# Patient Record
Sex: FEMALE | Race: WHITE
[De-identification: names, ages, dates, MRNs, and addresses within clinical notes are randomized per-mention and may not be internally consistent; named-entity substitution may affect disease eponyms.]

---

## 2020-06-09 ENCOUNTER — HOSPITAL ENCOUNTER (INPATIENT)
Dept: HOSPITAL 92 - ERS | Age: 60
LOS: 7 days | Discharge: NURSING, REHAB OR LONG-TERM CARE | DRG: 64 | End: 2020-06-16
Attending: FAMILY MEDICINE | Admitting: FAMILY MEDICINE
Payer: OTHER GOVERNMENT

## 2020-06-09 ENCOUNTER — HOSPITAL ENCOUNTER (EMERGENCY)
Dept: HOSPITAL 9 - MADERS | Age: 60
Discharge: ANOTHER HOSPITAL | End: 2020-06-09
Payer: OTHER GOVERNMENT

## 2020-06-09 VITALS — BODY MASS INDEX: 30.7 KG/M2

## 2020-06-09 DIAGNOSIS — I10: ICD-10-CM

## 2020-06-09 DIAGNOSIS — I62.9: Primary | ICD-10-CM

## 2020-06-09 DIAGNOSIS — G81.94: ICD-10-CM

## 2020-06-09 DIAGNOSIS — Z90.49: ICD-10-CM

## 2020-06-09 DIAGNOSIS — G81.91: ICD-10-CM

## 2020-06-09 DIAGNOSIS — I62.9: ICD-10-CM

## 2020-06-09 DIAGNOSIS — R47.01: ICD-10-CM

## 2020-06-09 DIAGNOSIS — E87.6: ICD-10-CM

## 2020-06-09 DIAGNOSIS — Z98.51: ICD-10-CM

## 2020-06-09 DIAGNOSIS — I63.312: Primary | ICD-10-CM

## 2020-06-09 DIAGNOSIS — N39.0: ICD-10-CM

## 2020-06-09 DIAGNOSIS — G93.6: ICD-10-CM

## 2020-06-09 LAB
ALBUMIN SERPL BCG-MCNC: 4.4 G/DL (ref 3.5–5)
ALP SERPL-CCNC: 121 U/L (ref 40–110)
ALT SERPL W P-5'-P-CCNC: 26 U/L (ref 8–55)
ANION GAP SERPL CALC-SCNC: 21 MMOL/L (ref 10–20)
APAP SERPL-MCNC: (no result) MCG/ML (ref 10–30)
APTT PPP: 23.8 SEC (ref 22.9–36.1)
AST SERPL-CCNC: 16 U/L (ref 5–34)
BASOPHILS # BLD AUTO: 0 THOU/UL (ref 0–0.2)
BASOPHILS NFR BLD AUTO: 0.2 % (ref 0–1)
BILIRUB SERPL-MCNC: 0.5 MG/DL (ref 0.2–1.2)
BUN SERPL-MCNC: 11 MG/DL (ref 9.8–20.1)
CALCIUM SERPL-MCNC: 10.1 MG/DL (ref 7.8–10.44)
CHLORIDE SERPL-SCNC: 104 MMOL/L (ref 98–107)
CK SERPL-CCNC: 94 U/L (ref 29–168)
CO2 SERPL-SCNC: 20 MMOL/L (ref 22–29)
CREAT CL PREDICTED SERPL C-G-VRATE: 0 ML/MIN (ref 70–130)
DRUG SCREEN CUTOFF: (no result)
EOSINOPHIL # BLD AUTO: 0 THOU/UL (ref 0–0.7)
EOSINOPHIL NFR BLD AUTO: 0.1 % (ref 0–10)
GLOBULIN SER CALC-MCNC: 3.4 G/DL (ref 2.4–3.5)
GLUCOSE SERPL-MCNC: 146 MG/DL (ref 70–105)
HGB BLD-MCNC: 14.4 G/DL (ref 12–16)
INR PPP: 1
LYMPHOCYTES # BLD AUTO: 0.7 THOU/UL (ref 1.2–3.4)
LYMPHOCYTES NFR BLD AUTO: 4.8 % (ref 21–51)
MCH RBC QN AUTO: 25.8 PG (ref 27–31)
MCV RBC AUTO: 84 FL (ref 78–98)
MEDTOX CONTROL LINE VALID?: (no result)
MONOCYTES # BLD AUTO: 0.4 THOU/UL (ref 0.11–0.59)
MONOCYTES NFR BLD AUTO: 2.4 % (ref 0–10)
MUCOUS THREADS UR QL AUTO: (no result) LPF
NEUTROPHILS # BLD AUTO: 14 THOU/UL (ref 1.4–6.5)
NEUTROPHILS NFR BLD AUTO: 92.5 % (ref 42–75)
PLATELET # BLD AUTO: 259 THOU/UL (ref 130–400)
POTASSIUM SERPL-SCNC: 3.8 MMOL/L (ref 3.5–5.1)
PROT UR STRIP.AUTO-MCNC: 30 MG/DL
PROTHROMBIN TIME: 13.1 SEC (ref 12–14.7)
RBC # BLD AUTO: 5.6 MILL/UL (ref 4.2–5.4)
RBC UR QL AUTO: (no result) HPF (ref 0–3)
SALICYLATES SERPL-MCNC: (no result) MG/DL (ref 15–30)
SODIUM SERPL-SCNC: 141 MMOL/L (ref 136–145)
WBC # BLD AUTO: 15.1 THOU/UL (ref 4.8–10.8)
WBC UR QL AUTO: (no result) HPF (ref 0–3)

## 2020-06-09 PROCEDURE — 80053 COMPREHEN METABOLIC PANEL: CPT

## 2020-06-09 PROCEDURE — 85025 COMPLETE CBC W/AUTO DIFF WBC: CPT

## 2020-06-09 PROCEDURE — 84484 ASSAY OF TROPONIN QUANT: CPT

## 2020-06-09 PROCEDURE — 95712 VEEG 2-12 HR INTMT MNTR: CPT

## 2020-06-09 PROCEDURE — 87077 CULTURE AEROBIC IDENTIFY: CPT

## 2020-06-09 PROCEDURE — 95957 EEG DIGITAL ANALYSIS: CPT

## 2020-06-09 PROCEDURE — S0028 INJECTION, FAMOTIDINE, 20 MG: HCPCS

## 2020-06-09 PROCEDURE — 80306 DRUG TEST PRSMV INSTRMNT: CPT

## 2020-06-09 PROCEDURE — 36416 COLLJ CAPILLARY BLOOD SPEC: CPT

## 2020-06-09 PROCEDURE — 82550 ASSAY OF CK (CPK): CPT

## 2020-06-09 PROCEDURE — 85610 PROTHROMBIN TIME: CPT

## 2020-06-09 PROCEDURE — 96365 THER/PROPH/DIAG IV INF INIT: CPT

## 2020-06-09 PROCEDURE — 80061 LIPID PANEL: CPT

## 2020-06-09 PROCEDURE — 81001 URINALYSIS AUTO W/SCOPE: CPT

## 2020-06-09 PROCEDURE — 95819 EEG AWAKE AND ASLEEP: CPT

## 2020-06-09 PROCEDURE — 80048 BASIC METABOLIC PNL TOTAL CA: CPT

## 2020-06-09 PROCEDURE — 83735 ASSAY OF MAGNESIUM: CPT

## 2020-06-09 PROCEDURE — 81003 URINALYSIS AUTO W/O SCOPE: CPT

## 2020-06-09 PROCEDURE — 96374 THER/PROPH/DIAG INJ IV PUSH: CPT

## 2020-06-09 PROCEDURE — 36415 COLL VENOUS BLD VENIPUNCTURE: CPT

## 2020-06-09 PROCEDURE — 93005 ELECTROCARDIOGRAM TRACING: CPT

## 2020-06-09 PROCEDURE — 93306 TTE W/DOPPLER COMPLETE: CPT

## 2020-06-09 PROCEDURE — 94760 N-INVAS EAR/PLS OXIMETRY 1: CPT

## 2020-06-09 PROCEDURE — 81015 MICROSCOPIC EXAM OF URINE: CPT

## 2020-06-09 PROCEDURE — 87086 URINE CULTURE/COLONY COUNT: CPT

## 2020-06-09 PROCEDURE — 51702 INSERT TEMP BLADDER CATH: CPT

## 2020-06-09 PROCEDURE — 93880 EXTRACRANIAL BILAT STUDY: CPT

## 2020-06-09 PROCEDURE — 70551 MRI BRAIN STEM W/O DYE: CPT

## 2020-06-09 PROCEDURE — 85730 THROMBOPLASTIN TIME PARTIAL: CPT

## 2020-06-09 PROCEDURE — 96366 THER/PROPH/DIAG IV INF ADDON: CPT

## 2020-06-09 PROCEDURE — 83036 HEMOGLOBIN GLYCOSYLATED A1C: CPT

## 2020-06-09 PROCEDURE — 83930 ASSAY OF BLOOD OSMOLALITY: CPT

## 2020-06-09 PROCEDURE — 70450 CT HEAD/BRAIN W/O DYE: CPT

## 2020-06-09 PROCEDURE — 87186 SC STD MICRODIL/AGAR DIL: CPT

## 2020-06-09 PROCEDURE — 80307 DRUG TEST PRSMV CHEM ANLYZR: CPT

## 2020-06-09 RX ADMIN — NICARDIPINE HYDROCHLORIDE PRN MLS: 25 INJECTION INTRAVENOUS at 09:35

## 2020-06-09 RX ADMIN — NICARDIPINE HYDROCHLORIDE PRN MLS: 25 INJECTION INTRAVENOUS at 17:25

## 2020-06-09 RX ADMIN — FAMOTIDINE SCH MG: 10 INJECTION, SOLUTION INTRAVENOUS at 20:15

## 2020-06-09 RX ADMIN — FAMOTIDINE SCH MG: 10 INJECTION, SOLUTION INTRAVENOUS at 09:34

## 2020-06-09 RX ADMIN — NICARDIPINE HYDROCHLORIDE PRN MLS: 25 INJECTION INTRAVENOUS at 11:27

## 2020-06-09 NOTE — PDOC.FPRHP
- History of Present Illness


Chief Complaint: Stroke


History of Present Illness: 





Pt is a 59 yo female transferred from [place] secondary to a hemorrhagic 

stroke noted on CT scan.  EMS was notified by pt's out of town  who was 

unable to get in touch with her for 17 hours. EMS found pt and was unable to 

provide much information but was having "stroke-like" symptoms affecting R side 

strength and a degree of aphasia.





PCP: [facility]


ED Course: 





In the ED, Dr. Ambriz consulted Neurosurgery, [place].  Cardene drip initiated. 

Believe it is non-surgical.





- History


PMHx:


 


PSHx: 





FHx:


 


Social:


 








- Vital signs


BP: 146/88  HR: 112 RR: 20 Tmax: 98.4 Pox: 97% on RA  Wt: 98 kg   








FMR H&P: Upper Level





- Pertinent history





59 yo F with hx of HTN here as transfer from [place] with dx there of 

hemorrhagic CVA. Pt was last seen normal 17 hours PTA. She was found after a 

wellness check called in by her  who is currently abroad. Upon arrival 

to the ED she was aphasic and had right hemiplegia. There were no significant 

lab abnormalities. BP was greater than 220 systolic. CT here show intracranial 

hemorrhage in the L MCA distribution. Neurosurgery was consulted from the ER 

who determined there to be no surgical intervention indicated at that time. She 

was started on a Cardene drip in the ER and BP improved to the 140 systolic 

range. Upon evaluation pt could no appropriately answer questions





PMHx


HTN





Surgical hx


Appendectomy 





Social 


No etoh, tobacco, or drug history  





- Pertinent findings





See intern note for full ROS, PE, vitals, and labs





ROS  unobtainable 





PE


General NAD, awake and responsive 


HEENT NCAT


CV RRR, 3/6 systolic murmur 


Resp CTA, no respiratory distress


Abd non tender, no distension, normal BS


Extremities no edema, equal pedal pulses


Skin multiple dressed ulcerations on LE


Neuro pt has slowed mentation, but will move all extremities upon command. She 

has equal strength b/l. CN appear to be intact, however she is having some 

difficulty following commands involved in evaluation. Pt can speak, however 

does not answer appropriately 








- Plan


Date/Time: 06/09/20 0656








Ad QUEVEDO,  PGY3, have evaluated this patient and agree with findings/

plan as outlined by intern resident. Pertinent changes/additions are listed 

here.








1.Acute hemorrhagic CVA


-Admit to CCU


-Continue cardene with goal of <160 systolic per neurosurg recommendations


-Repeat CT at 1200


-Consult stroke team


-Lipid, A1c, TSH





2.HTN


-Pt apparently not on any meds per . Will start meds as indicated by BP 

after cardene is stopped   





PPx SCD


Diet NPO


Code Full








Addendum - Attending





- Attending Attestation


Date/Time: 06/09/20 0900





I personally evaluated the patient and discussed the management with Dr. aGge

/Guillaume. 


I agree with the History, Examination, Assessment and Plan documented above 

with any addition or exceptions noted below.





Patient here with acute hemorrhagic infarct seen on CT with initial R sided 

paresis that has improved somewhat. She is currently awake, but slow to 

respond. Has expressive aphasia. She is able to move both the RUE and RLE. NSGY 

on board, recommends repeat NCHCT in a few hours. Cardene with goal SBP <160. 

Will need otherwise the normal post- hemorrhagic CVA care including Neuro, 

therapy services, and likely placement. Hold ASA.

## 2020-06-09 NOTE — CON
DATE OF CONSULTATION:  



Ms. Sy is a 60-year-old woman transferred to us from [facility]

for stroke-like symptoms reportedly from hospital there.   is deployed

overseas and had been in contact with his wife now 20 to 24 hours ago, but had not

heard from her since and has not been able to make any contact since either.  For

this reason, he called the [facility] for a welfare check.  At which point, folks found

her on scene confused with possible stroke-like symptoms.  She was taken to

[place] [facility] ER, where CT was performed, that showed a superficial left-sided

frontoparietal intracerebral hemorrhage, which measures roughly 40 cubic centimeter

in size with sizable rim of vasogenic edema and midline shift, measuring around 3

mm.  She was then transferred to [facility] for further evaluation and

admission.  We do not have any medical history on file other than knowing patient

has history of hypertension.  She was extraordinarily hypertensive in the department

in [place] and upon arrival here with systolics in the 220s, though we do not

know what medications that she is currently on.   is not reachable given he

is out of the country at the moment and we have no other additional contacts to

verify.  Coagulation studies and platelet count are normal.  However, there are

certainly anticoagulant and antiplatelets that will not cause such drastic lab

abnormalities.  At bedside this morning, the patient is drowsy, but arousable with

minimal effort.  Upon repeated questioning, she is able to tell me her name, but

does not tell me the month or the year.  She does follow simple commands, squeezes

my hands, raises her legs, wiggles her toes bilaterally.  She actually has quite

excellent strength in the right upper and right lower extremity, which sounds to be

much improved, given our initial discussion was right-sided weakness that was pretty

dense.  Recommendation at this time, systolic pressure is under 160 with repeat CT

scan around noon today.  As long as this looks stable, this likely represents

nonoperative hemorrhage if she develops more significant midline shift and vasogenic

edema, it is reasonable to consider adding mannitol therapy, but for now, we will

hold this given her relatively improved neurologic examination and minimal midline

shift.  We will know more after repeat CT. 







Job ID:  [id number]

## 2020-06-09 NOTE — CON
DATE OF CONSULTATION:  



HISTORY OF PRESENT ILLNESS:  Anu Sy is a 60-year-old female from

[place], who apparently was found with stroke-like symptoms.  In the emergency

room, CT showed a large left-sided hemorrhage, probably hypertensive.  She was put

on a Cardene drip and transferred here.  In the ICU, she has been complaining of

some pain, she is not moving her right side.  Pulse is 103, blood pressure 160/83,

saturations 95%, and respiratory rate 17. 



We are unable to get any additional information at this stage.  We will try and

obtain one as soon as family is available. 



PAST MEDICAL HISTORY:  Unknown.



PAST SURGICAL HISTORY:  Unknown.



REVIEW OF SYSTEMS:  Otherwise, unobtainable.



PHYSICAL EXAMINATION:

VITAL SIGNS:  Temperature is 98, pulse is 107, blood pressure 159/83, and sats are

95%. 

CHEST:  Decreased breath sounds.  No wheezing. 

CARDIAC:  Normal S1 and S2.  No gallops. 

ABDOMEN:  No masses.



LABORATORY DATA:  Labs, so far, unremarkable.  Lytes are normal.  White count 15,000

__________ drug screen is negative. 



IMPRESSION:  Left temporoparietal hemorrhage, large, 5 x 5 cm, history of presumed

hypertension. 



PLAN:  The patient is complaining of pain.  We are going to initiate some pain

medicine, low-dose morphine. 



Unfortunately, there is no IV Tylenol. 



Pulmonary/Critical Care will follow while in the ICU. 



Consultation note, 70 minutes, 50% direct patient care. 



Please note, we will wait for additional history to be obtained from family members

as soon as they arrive. 







Job ID:  [id number]

## 2020-06-09 NOTE — CON
DATE OF CONSULTATION:  06/09/2020



REASON FOR CONSULTATION:  Hemorrhagic stroke.



HISTORY OF PRESENT ILLNESS:  Ms. Anu Sy is a 60-year-old female, who has 
been

transferred from [place] because of hemorrhagic stroke, which was noted on 
head

CT.  The patient is unable to provide history at this time.  The history is 
obtained

from review of the records.  According to the review of the records, the 
 who

was out of town called the EMS to check on the patient since he was unable to 
get in

touch for the last 17 hours.  EMS found the patient at home, confused and 
unable to

provide history with right-sided weakness.   In the  emergency room, she was 
found

to be aphasic and had right hemiplegia.  Her blood pressure was greater than 
220 and the

head CT shows intracranial hemorrhage in the left MCA distribution.  
Neurosurgery

was consulted and they determined there was no surgical intervention needed at 
this

time and she was started on Cardene drip and sent to ICU for further 
evaluation. 



REVIEW OF SYSTEMS:  Unobtainable due to patient's mental status.



PAST MEDICAL HISTORY:  Hypertension.



PAST SURGICAL HISTORY:  Appendectomy.



SOCIAL HISTORY:  .  Lives with .  No history of alcohol, illegal 
drug

abuse, or tobacco abuse. 

 



FAMILY HISTORY: No significant history.



PHYSICAL EXAMINATION:

GENERAL:  The patient is awake, alert, follows commands intermittently. 

HEENT:  Normocephalic and atraumatic. 

CVS:  Regular rate and rhythm. 

CHEST:  Clear. 

ABDOMEN:  Soft. 

NEUROLOGICAL:  Mental status; the patient is alert, awake, follows commands

intermittently.  Oriented to her name only.  Cranial nerves 2 through 12 intact.

Speech, receptive aphasia.  Muscle, tone, and bulk are normal.  Strength 3/5 in 
the

right upper and lower extremity.  5/5 in the left upper and lower extremity.

Sensory:  Withdraws to nailbed pressure, left greater than right.  Cerebellar, 
did

not cooperate with the exam.  Gait deferred due to patient's safety reason.  She

does seem to have right extraocular movements.  Follows commands intermittently
, but

appears to have  right  homonymous hemianopia.  Pupils equal and reactive to

light.  Face symmetric.  Tongue midline.  Moves neck in both direction.  Hearing

seems to be intact. 



DATA REVIEWED:  I reviewed the head CT, which showed hemorrhage in the left MCA

distribution. 



ASSESSMENT AND PLAN:  Ms. Anu Sy is consulted for hemorrhagic stroke.

Recommend MRI of the brain when stable.  Neuro checks every 2 hours.  Consider 
stat

noncontrast head CT if the condition is going to decline.

Hold aspirin at this time.  Monitor blood pressure.  Systolic blood pressure 
should

be less than 160.  Continue medical management per primary team.  MRI of the 
brain

when stable.  2D echocardiogram, carotid Dopplers when stable.  Check lipid 
panel,

hemoglobin A1c, TSH, telemetry.  Continue medical management per primary team. 
PT/OT/Speech when stable. We

will continue to follow. 



Thank you for the consult.







Job ID:  [id number]



[facility] Hospital[facility]AMIAN

## 2020-06-09 NOTE — CT
CT BRAIN NONCONTRAST:



DATE:

6/9/2020

12 6:00 PM



HISTORY:

60-year-old female follow-up intracranial hemorrhage



COMPARISON:

6/9/2020

4:59 AM



FINDINGS:

Previously, the scan was performed in transverse plane, without angulation. On the current CT, the st
andard coronal-transverse angulation was applied. This makes comparison slightly difficult.



The acute left temporal parietal large intra-axial hematoma measuring approximately 5.8 x 3.9 cm, wit
h surrounding vasogenic edema, has probably not significantly changed in size. It causes mass

effect, completely effacing the trigone and occipital horn of the left lateral ventricle, distorting 
the lateral ventricles, and causing right to left midline shift of the septum pellucidum a distance

of approximately 0.8 cm. There is no new hemorrhage. There is probably no interval change overall.



IMPRESSION:

1) large, acute, left cerebral intra-axial hematoma causing mass effect and subfalcine herniation.

2) probably no significant interval change.

3) continued follow-up recommended.



Reported By: Darryl Obregon 

Electronically Signed:  6/9/2020 12:19 PM

## 2020-06-10 LAB
ANION GAP SERPL CALC-SCNC: 11 MMOL/L (ref 10–20)
BASOPHILS # BLD AUTO: 0 THOU/UL (ref 0–0.2)
BASOPHILS NFR BLD AUTO: 0.1 % (ref 0–1)
BUN SERPL-MCNC: 11 MG/DL (ref 9.8–20.1)
CALCIUM SERPL-MCNC: 9.5 MG/DL (ref 7.8–10.44)
CHD RISK SERPL-RTO: 5.2 (ref ?–4.5)
CHLORIDE SERPL-SCNC: 107 MMOL/L (ref 98–107)
CHOLEST SERPL-MCNC: 193 MG/DL
CO2 SERPL-SCNC: 25 MMOL/L (ref 22–29)
CREAT CL PREDICTED SERPL C-G-VRATE: 93 ML/MIN (ref 70–130)
EOSINOPHIL # BLD AUTO: 0 THOU/UL (ref 0–0.7)
EOSINOPHIL NFR BLD AUTO: 0.1 % (ref 0–10)
GLUCOSE SERPL-MCNC: 129 MG/DL (ref 70–105)
HDLC SERPL-MCNC: 37 MG/DL
HGB BLD-MCNC: 13.3 G/DL (ref 12–16)
LDLC SERPL CALC-MCNC: 137 MG/DL
LYMPHOCYTES # BLD: 0.7 THOU/UL (ref 1.2–3.4)
LYMPHOCYTES NFR BLD AUTO: 5.8 % (ref 21–51)
MCH RBC QN AUTO: 28 PG (ref 27–31)
MCV RBC AUTO: 82.9 FL (ref 78–98)
MONOCYTES # BLD AUTO: 0.4 THOU/UL (ref 0.11–0.59)
MONOCYTES NFR BLD AUTO: 3.6 % (ref 0–10)
NEUTROPHILS # BLD AUTO: 11.1 THOU/UL (ref 1.4–6.5)
NEUTROPHILS NFR BLD AUTO: 90.5 % (ref 42–75)
PLATELET # BLD AUTO: 218 THOU/UL (ref 130–400)
POTASSIUM SERPL-SCNC: 3.4 MMOL/L (ref 3.5–5.1)
RBC # BLD AUTO: 4.73 MILL/UL (ref 4.2–5.4)
SODIUM SERPL-SCNC: 140 MMOL/L (ref 136–145)
TRIGL SERPL-MCNC: 96 MG/DL (ref ?–150)
WBC # BLD AUTO: 12.3 THOU/UL (ref 4.8–10.8)

## 2020-06-10 RX ADMIN — NICARDIPINE HYDROCHLORIDE PRN MLS: 25 INJECTION INTRAVENOUS at 00:20

## 2020-06-10 RX ADMIN — NICARDIPINE HYDROCHLORIDE PRN MLS: 25 INJECTION INTRAVENOUS at 11:08

## 2020-06-10 RX ADMIN — NICARDIPINE HYDROCHLORIDE PRN MLS: 25 INJECTION INTRAVENOUS at 07:55

## 2020-06-10 RX ADMIN — FAMOTIDINE SCH MG: 10 INJECTION, SOLUTION INTRAVENOUS at 08:38

## 2020-06-10 NOTE — PRG
DATE OF SERVICE:  



SUBJECTIVE:  This morning, less nauseated, still got a headache.



OBJECTIVE:  VITAL SIGNS:  Saturations are 95% on room air, blood pressure 146/90,

pulse 100.  She is on a Cardene drip. 

CHEST:  No wheezing.  No crackles. 

CARDIAC:  Normal S1, S2.  No gallops. 

ABDOMEN:  Soft.



LABORATORY DATA:  Potassium 3.4.  CT head shows a left intracerebral hemorrhage with

shift. 



PLAN:  Continue supportive care.  Agree with Speech.  If she can swallow, we can

give her some potassium. 



We will follow while in the ICU.







Job ID:  [id number]

## 2020-06-10 NOTE — PDOC.HOSPP
- Subjective


Encounter Date: 06/10/20


Subjective: 





NEUROLOGY PROGRESS NOTE





No acute events overnight. Patient more responsive today.





- Objective


Vital Signs & Weight: 


 Vital Signs (12 hours)











  Temp Pulse Pulse BP BP BP Pulse Ox


 


 06/10/20 12:00  98.4 F      


 


 06/10/20 10:58     136/81   


 


 06/10/20 10:31   100  100   136/81  138/83 


 


 06/10/20 07:10        95


 


 06/10/20 04:00  99.2 F      


 


 06/10/20 00:44        92 L














  Pulse Ox Pulse Ox


 


 06/10/20 12:00  


 


 06/10/20 10:58  


 


 06/10/20 10:31  95  96


 


 06/10/20 07:10  


 


 06/10/20 04:00  


 


 06/10/20 00:44  








 Weight











Admit Weight                   167 lb


 


Weight                         167 lb 8.821 oz











 Most Recent Monitor Data











Heart Rate from ECG            91


 


NIBP                           122/76


 


NIBP BP-Mean                   91


 


Respiration from ECG           16


 


SpO2                           96














I&O: 


 











 06/09/20 06/10/20 06/11/20





 06:59 06:59 06:59


 


Intake Total  3933 0


 


Output Total  3150 555


 


Balance  783 -555











Result Diagrams: 


 06/10/20 03:19





 06/10/20 03:19


Additional Labs: 


 Accuchecks











  06/10/20 06/09/20 06/09/20





  00:21 18:00 12:49


 


POC Glucose  128 H  86  94











Radiology Reviewed by me: Yes


EKG Reviewed by me: Yes





Hospitalist ROS





- Review of Systems


ROS unobtainable: due to mental status


Neurological: reports: weakness, numbness





- Medication


Medications: 


Active Medications











Generic Name Dose Route Start Last Admin





  Trade Name Freq  PRN Reason Stop Dose Admin


 


Famotidine  20 mg  06/09/20 09:00  06/10/20 08:38





  Pepcid  SLOW IVP   20 mg





  Q12HR DIANA   Administration





     





     





     





     


 


Sodium Chloride  1,000 mls @ 135 mls/hr  06/09/20 08:11  06/10/20 08:43





  Normal Saline 0.9%  IV   1,000 mls





  .Q7H25M DIANA   Administration





     





     





     





     


 


Nicardipine HCl 25 mg/ Sodium  250 mls @ 0 mls/hr  06/09/20 08:44  06/10/20 11:

08





  Chloride  IVPB   250 mls





  INF PRN   Administration





  SBP > 140   





     





  Protocol   





  Titrate   


 


Morphine Sulfate  2 mg  06/09/20 10:58  06/10/20 00:43





  Morphine  SLOW IVP   2 mg





  Q4H PRN   Administration





  Mild-Moderate Pain (1-5)   





     





     





     


 


Potassium Chloride  40 meq  06/10/20 10:30  06/10/20 10:58





  K-Dur  PO  06/10/20 13:00  40 meq





  NOW DIANA   Administration





     





     





     





     














- Exam


General Appearance: awake alert


Eye: PERRL


ENT: normocephalic atraumatic


Neck: supple


Heart: RRR


Respiratory: CTAB


Gastrointestinal: soft


Extremities: no cyanosis, no clubbing, no edema


Skin: normal turgor, no lesions, no rashes


Neurological: hemiplegia, speech deficit


Psychiatric: normal affect, normal behavior, oriented to person





Hosp A/P


(1) Hemorrhagic cerebrovascular accident (CVA)


Code(s): I61.9 - NONTRAUMATIC INTRACEREBRAL HEMORRHAGE, UNSPECIFIED   Status: 

Acute   





(2) Chronic hypertension


Code(s): I10 - ESSENTIAL (PRIMARY) HYPERTENSION   Status: Acute   





- Plan


plan discussed w/ family (Plan discussed with son), speech therapy





60 year old female with hemorrhagic stroke. CT head  showed  large 

intraparenchymal hematoma in the left temporal parietal region measuring about 

5.2 cm x 3.9 cm x 5.4 cm with adjacent vasogenic edema.  There is a midline 

shift to the right side for about 8 mm.  There is effacement of the occipital 

horn of the lateral ventricle.





Repeat HCT stable.


Neurochecks everyt 2 hours.


Repeat NCHCT if the condition declines.


Monitor BP ON cardene drip SBP < 160.


Telemetry


Cleared by speech .


Continue supportive measures.


Hold AC for now.


Stroke work up including MRI brain, Echo and carotid dopplers when stable.


PT/OT when stable.


Plan discussed in detail with the patient's son.

## 2020-06-10 NOTE — PDOC.FM
- Subjective


Subjective: 





NAEO per nursing. Patient unable to effectively communicate or answer 

questions. Semi fluent speech but inappropriate responses to questions, similar 

to yesterday's exam. Awake, alert but difficulty with comprehension. Patient 

denies pain, headache. 





- Objective


MAR Reviewed: Yes


Vital Signs & Weight: 


 Vital Signs (12 hours)











  Temp Pulse Ox


 


 06/10/20 07:10   95


 


 06/10/20 04:00  99.2 F 


 


 06/10/20 00:44   92 L


 


 06/10/20 00:00  98.5 F 


 


 06/09/20 20:00  99.4 F  92 L








 Weight











Weight                         76 kg











 Most Recent Monitor Data











Heart Rate from ECG            105


 


NIBP                           146/93


 


NIBP BP-Mean                   110


 


Respiration from ECG           15


 


SpO2                           95














I&O: 


 











 06/09/20 06/10/20 06/11/20





 06:59 06:59 06:59


 


Intake Total  3933 0


 


Output Total  3150 65


 


Balance  783 -65











Result Diagrams: 


 06/10/20 03:19





 06/10/20 03:19





Phys Exam





- Physical Examination


Constitutional: NAD


HEENT: PERRLA, moist MMs, sclera anicteric


Neck: full ROM


no respiratory distress


Cardiovascular: no significant murmur


tachycardic


Gastrointestinal: soft, non-tender


Musculoskeletal: no edema


Neurological: non-focal, moves all 4 limbs


pt responds with coherent wording but inappropriately


Deviation from normal: a&o x0 due to comprehension





Dx/Plan


(1) Hemorrhagic cerebrovascular accident (CVA)


Code(s): I61.9 - NONTRAUMATIC INTRACEREBRAL HEMORRHAGE, UNSPECIFIED   Status: 

Acute   





(2) Chronic hypertension


Code(s): I10 - ESSENTIAL (PRIMARY) HYPERTENSION   Status: Acute   





- Plan


Plan: 








Acute hemorrhagic CVA with subfalcine herniation


Neuro exam unchanged today


S/P mannitol x1


BP control < 160mmHg, cardizem gtt at 7.5


Neurosurgery on board, recs appreciated 





Chronic HTN


Will need to start long term antihypertensive once stabilized





VTE: scd's, anticoagulation contraindicated


Diet: NPO, pending speech


Fluids:  mls/hr while NPO


Code: full per 


Dispo: Continue BP control w/ cardene drip. Diet pending speech eval. Continue 

neuro checks. 








Addendum - Attending





- Attending Attestation


Date/Time: 06/10/20 1153





I personally evaluated the patient and discussed the management with Dr. Garcia. 


I agree with the History, Examination, Assessment and Plan documented above 

with any addition or exceptions noted below.





Patient mentation overall improved this morning. She has been cleared by SLP 

for a PO diet. Will start transitioning to PO BP meds and wean Cardene. NSGY 

and Neuro on board. Suspect once off Cardene that she can transfer to stroke 

unit. Holding ASA, but continue other usual post-CVA care.

## 2020-06-11 LAB
ANION GAP SERPL CALC-SCNC: 9 MMOL/L (ref 10–20)
BASOPHILS # BLD AUTO: 0 THOU/UL (ref 0–0.2)
BASOPHILS NFR BLD AUTO: 0.2 % (ref 0–1)
BUN SERPL-MCNC: 11 MG/DL (ref 9.8–20.1)
CALCIUM SERPL-MCNC: 9.3 MG/DL (ref 7.8–10.44)
CHLORIDE SERPL-SCNC: 105 MMOL/L (ref 98–107)
CO2 SERPL-SCNC: 29 MMOL/L (ref 22–29)
CREAT CL PREDICTED SERPL C-G-VRATE: 92 ML/MIN (ref 70–130)
EOSINOPHIL # BLD AUTO: 0 THOU/UL (ref 0–0.7)
EOSINOPHIL NFR BLD AUTO: 0.3 % (ref 0–10)
GLUCOSE SERPL-MCNC: 98 MG/DL (ref 70–105)
HGB BLD-MCNC: 13.2 G/DL (ref 12–16)
LYMPHOCYTES # BLD: 1.5 THOU/UL (ref 1.2–3.4)
LYMPHOCYTES NFR BLD AUTO: 14.6 % (ref 21–51)
MCH RBC QN AUTO: 26.7 PG (ref 27–31)
MCV RBC AUTO: 82.8 FL (ref 78–98)
MONOCYTES # BLD AUTO: 0.7 THOU/UL (ref 0.11–0.59)
MONOCYTES NFR BLD AUTO: 7.1 % (ref 0–10)
NEUTROPHILS # BLD AUTO: 7.9 THOU/UL (ref 1.4–6.5)
NEUTROPHILS NFR BLD AUTO: 77.9 % (ref 42–75)
PLATELET # BLD AUTO: 218 THOU/UL (ref 130–400)
POTASSIUM SERPL-SCNC: 3.2 MMOL/L (ref 3.5–5.1)
RBC # BLD AUTO: 4.94 MILL/UL (ref 4.2–5.4)
SODIUM SERPL-SCNC: 140 MMOL/L (ref 136–145)
WBC # BLD AUTO: 10.1 THOU/UL (ref 4.8–10.8)

## 2020-06-11 RX ADMIN — POTASSIUM BICARBONATE SCH MEQ: 977.5 TABLET, EFFERVESCENT ORAL at 08:47

## 2020-06-11 NOTE — PRG
DATE OF SERVICE:  06/11/2020



SUBJECTIVE:  This morning, she is a little bit more awake, responsive, less

headache, still not moving the right side. 



OBJECTIVE:  VITAL SIGNS:  Blood pressure 154/92, pulse 70, respiratory rate 18,

saturations are 95% on room air. 

CHEST:  No wheezing, crackles. 

CARDIAC:  Normal S1, S2.  No gallops. 

ABDOMEN:  No masses.



LABORATORY DATA:  Unremarkable.



ASSESSMENT:  Hypertensive bleed, left sided; residual right hemiparesis.  P.o.

medication and PT supportive care.  She will probably be transferred to the Stroke

Unit. 







Job ID:  [id number]

## 2020-06-11 NOTE — PDOC.HOSPP
- Subjective


Encounter Date: 06/11/20


Subjective: 





NEUROLOGY PROGRESS NOTE





No acute events overnight.


More alert and following commands intermitttently. She is speaking in 

senternces but does not reply appropriately yo questions and unable to tell her 

name.





- Objective


Vital Signs & Weight: 


 Vital Signs (12 hours)











  Temp BP Pulse Ox


 


 06/11/20 09:38   169/100 H 


 


 06/11/20 08:48   144/90 H 


 


 06/11/20 07:15    96


 


 06/11/20 07:00  99.1 F  


 


 06/11/20 04:00  98.9 F  


 


 06/11/20 01:00  98.5 F  


 


 06/11/20 00:32    92 L








 Weight











Admit Weight                   167 lb


 


Weight                         174 lb 9.698 oz











 Most Recent Monitor Data











Heart Rate from ECG            88


 


NIBP                           147/93


 


NIBP BP-Mean                   111


 


Respiration from ECG           16


 


SpO2                           93














I&O: 


 











 06/10/20 06/11/20 06/12/20





 06:59 06:59 06:59


 


Intake Total 3933 2521 240


 


Output Total 3150 2260 485


 


Balance 783 261 -245











Result Diagrams: 


 06/11/20 03:29





 06/11/20 03:29


Additional Labs: 


 Accuchecks











  06/10/20 06/10/20 06/10/20





  20:19 15:44 09:23


 


POC Glucose  99  92  123 H











Radiology Reviewed by me: Yes


EKG Reviewed by me: Yes





Hospitalist ROS





- Review of Systems


ROS unobtainable: due to mental status (aphasia)


Neurological: reports: change in speech





- Medication


Medications: 


Active Medications











Generic Name Dose Route Start Last Admin





  Trade Name Freq  PRN Reason Stop Dose Admin


 


Carvedilol  6.25 mg  06/10/20 17:00  06/11/20 08:48





  Coreg  PO   6.25 mg





  BID-WM DIANA   Administration





     





     





     





     


 


Carvedilol  6.25 mg  06/11/20 09:21  06/11/20 09:38





  Coreg  PO  06/11/20 12:00  6.25 mg





  NOW DIANA   Administration





     





     





     





     


 


Famotidine  20 mg  06/10/20 21:00  06/11/20 08:47





  Pepcid  PO   20 mg





  Q12HR DIANA   Administration





     





     





     





     


 


Nicardipine HCl 25 mg/ Sodium  250 mls @ 0 mls/hr  06/09/20 08:44  06/10/20 11:

08





  Chloride  IVPB   250 mls





  INF PRN   Administration





  SBP > 140   





     





  Protocol   





  Titrate   


 


Potassium Bicarb/Potassium Chloride  25 meq  06/11/20 08:00  06/11/20 08:47





  K-Lyte Cl  PO   25 meq





  QAM-WM DIANA   Administration





     





     





     





     














- Exam


General Appearance: awake alert


Eye: PERRL, anicteric sclera


ENT: normocephalic atraumatic, no oropharyngeal lesions


Neck: supple, symmetric


Heart: RRR


Respiratory: CTAB


Gastrointestinal: soft


Extremities: no cyanosis


Skin: normal turgor, no lesions, no rashes


Neurological: cranial nerve grossly intact, no new deficit, speech deficit


Neurological - other findings: right hemiparesis, aphasia


Musculoskeletal: normal tone, no muscle wasting


Psychiatric: normal affect, not oriented (aphasia)





Hosp A/P


(1) Hemorrhagic cerebrovascular accident (CVA)


Code(s): I61.9 - NONTRAUMATIC INTRACEREBRAL HEMORRHAGE, UNSPECIFIED   Status: 

Acute   





(2) Chronic hypertension


Code(s): I10 - ESSENTIAL (PRIMARY) HYPERTENSION   Status: Acute   





- Plan


plan discussed w/ family (sister), PT/OT, speech therapy, DVT proph w/SCDs





60 year old female with hemorrhagic stroke. CT head  showed  large 

intraparenchymal hematoma in the left temporal parietal region measuring about 

5.2 cm x 3.9 cm x 5.4 cm with adjacent vasogenic edema.  There is a midline 

shift to the right side for about 8 mm.  There is effacement of the occipital 

horn of the lateral ventricle. Repeat HCT stable.





Stable. Transferred to stroke unit.


Recommend MRI brain.


Recommend 2 D Echo and carotid dopplers. 


Neurochecks everyt 2 hours.


Repeat NCHCT if the condition declines.


Monitor BP ON cardene drip SBP < 160.


Telemetry


Strict control of BP and BG.


Recommend statin for secondary stroke prevention.


Continue supportive measures.


PT/OT/ Speech  when stable.


Cpntinue medical management per primary team.


Plan discussed in detail with the patient's  sister

## 2020-06-11 NOTE — PDOC.FM
- Subjective


Subjective: 





NAEO. Cardene gtt off last evening. SBPs all under control. Pt able to respond 

yes/no to questions. Follow simple commands. Less lethargic compared to 

yesterday. Difficulty with speech still





- Objective


Vital Signs & Weight: 


 Vital Signs (12 hours)











  Temp Pulse Ox


 


 06/11/20 04:00  98.9 F 


 


 06/11/20 01:00  98.5 F 


 


 06/11/20 00:32   92 L


 


 06/10/20 22:00  98.7 F 


 


 06/10/20 20:00   97








 Weight











Admit Weight                   75.75 kg


 


Weight                         79.2 kg











 Most Recent Monitor Data











Heart Rate from ECG            85


 


NIBP                           137/89


 


NIBP BP-Mean                   105


 


Respiration from ECG           0


 


SpO2                           96














I&O: 


 











 06/10/20 06/11/20 06/12/20





 06:59 06:59 06:59


 


Intake Total 3933 2521 


 


Output Total 3150 2260 


 


Balance 783 261 











Result Diagrams: 


 06/11/20 03:29





 06/11/20 03:29





Phys Exam





- Physical Examination


Constitutional: NAD


HEENT: PERRLA, sclera anicteric


Respiratory: no wheezing, clear to auscultation bilateral


Cardiovascular: no significant murmur


tachycardic


Gastrointestinal: soft, non-tender


Musculoskeletal: no edema


difficulty with right hand movement. 3/5 strength in other extrem


speech pauses but fluent 


Deviation from normal: flat affect





Dx/Plan


(1) Hemorrhagic cerebrovascular accident (CVA)


Code(s): I61.9 - NONTRAUMATIC INTRACEREBRAL HEMORRHAGE, UNSPECIFIED   Status: 

Acute   





(2) Chronic hypertension


Code(s): I10 - ESSENTIAL (PRIMARY) HYPERTENSION   Status: Acute   





- Plan


Plan: 








Acute hemorrhagic CVA with subfalcine herniation


Neuro exam unchanged today


S/P mannitol x1


BP control controlled with PO coreg, cardene gtt discontinued


PT/ST/OT eval today


Echo, carotid dopplers


Stable to stroke transfer





Chronic HTN


Coreg





Hypokalemia


Replace, check Mg





VTE: scd's, anticoagulation contraindicated


Diet: Puree


Fluids: SL


Code: full per 


Dispo: Continue CVA w/u with CM consulted to work on placement after PT/ST/OT.





Addendum - Attending





- Attending Attestation


Date/Time: 06/11/20 1131





I personally evaluated the patient and discussed the management with Dr. Garcia. 


I agree with the History, Examination, Assessment and Plan documented above 

with any addition or exceptions noted below.





Patient overall stable. BP improved but will increase Coreg for better control. 

Off Cardene. Needs the usual post CVA care and therapy. Neuro on board. Will 

need likely rehab placement once she is stable for discharge.

## 2020-06-11 NOTE — MRI
MRI OF BRAIN WITHOUT CONTRAST: 

6/11/20

 

INDICATIONS:

Follow-up parenchymal hematoma. Hemorrhagic CVA given as reason for exam. 

 

COMPARISON: 

Comparison made to CT scans of 6/9/20.

 

FINDINGS: 

The parenchymal hematoma involving the left parietal lobe is again noted. Hematoma measures 4 to 5 cm
 AP dimension, unchanged when compared to recent CT. Surrounding vasogenic edema and mild mass effect
. There is midline shift measured at 6 to 7 mm, unchanged.  Signal characteristic consistent with mouna
lving hematoma. There are high T1 areas with predominantly low T2 signal characteristics. This would 
be consistent with subacute hematoma. 

 

Mild restricted diffusion around the periphery of the hematoma. There is no other areas of restricted
 diffusion. 

 

The intracranial internal carotid arteries, cerebral arteries and basilar arteries show flow voids. D
ural venous sinuses appear patent. 

 

IMPRESSION: 

Left parietal lobe hematoma is again noted. There is mass effect and midline shift which has not sign
ificantly changed when compared to CT of 6/9/20. 

 

POS: AGW

## 2020-06-11 NOTE — ULT
EXAM:

BILATERAL CAROTID DUPLEX ULTRASOUND INCLUDING COLOR AND SPECTRAL DOPPLER IMAGIN20

 

HISTORY: 

CVA. 

 

FINDINGS: 

minimal intimal thickening bilaterally involving the distal CCAs and proximal ICAs. 

 

PSV right ICA 79 cm/s. EDV 12 cm/s. ICA/CCA ratio 0.8.

PSV left ICA 68 cm/s. EDV 15 cm/s. ICA/CCA ratio 0.8. 

 

Vertebral flow is antegrade. 

 

IMPRESSION: 

1.      No abnormal increased velocities. 

2.      Minimal intimal thickening bilaterally, evidence for atherosclerotic carotid artery vascular 
disease.

 

POS: AH

## 2020-06-12 LAB
ANION GAP SERPL CALC-SCNC: 10 MMOL/L (ref 10–20)
BACTERIA UR QL AUTO: (no result) HPF
BASOPHILS # BLD AUTO: 0 THOU/UL (ref 0–0.2)
BASOPHILS NFR BLD AUTO: 0.3 % (ref 0–1)
BUN SERPL-MCNC: 14 MG/DL (ref 9.8–20.1)
CALCIUM SERPL-MCNC: 9.6 MG/DL (ref 7.8–10.44)
CHLORIDE SERPL-SCNC: 107 MMOL/L (ref 98–107)
CO2 SERPL-SCNC: 26 MMOL/L (ref 22–29)
CREAT CL PREDICTED SERPL C-G-VRATE: 100 ML/MIN (ref 70–130)
EOSINOPHIL # BLD AUTO: 0 THOU/UL (ref 0–0.7)
EOSINOPHIL NFR BLD AUTO: 0.5 % (ref 0–10)
GLUCOSE SERPL-MCNC: 110 MG/DL (ref 70–105)
HGB BLD-MCNC: 14.3 G/DL (ref 12–16)
LEUKOCYTE ESTERASE UR QL STRIP.AUTO: 500 LEU/UL
LYMPHOCYTES # BLD: 1.6 THOU/UL (ref 1.2–3.4)
LYMPHOCYTES NFR BLD AUTO: 15.5 % (ref 21–51)
MCH RBC QN AUTO: 27.5 PG (ref 27–31)
MCV RBC AUTO: 83.1 FL (ref 78–98)
MONOCYTES # BLD AUTO: 0.8 THOU/UL (ref 0.11–0.59)
MONOCYTES NFR BLD AUTO: 7.7 % (ref 0–10)
NEUTROPHILS # BLD AUTO: 7.7 THOU/UL (ref 1.4–6.5)
NEUTROPHILS NFR BLD AUTO: 76.1 % (ref 42–75)
PLATELET # BLD AUTO: 245 THOU/UL (ref 130–400)
POTASSIUM SERPL-SCNC: 3.4 MMOL/L (ref 3.5–5.1)
PROT UR STRIP.AUTO-MCNC: 20 MG/DL
RBC # BLD AUTO: 5.21 MILL/UL (ref 4.2–5.4)
RBC UR QL AUTO: (no result) HPF (ref 0–3)
SODIUM SERPL-SCNC: 140 MMOL/L (ref 136–145)
WBC # BLD AUTO: 10.1 THOU/UL (ref 4.8–10.8)
WBC UR QL AUTO: (no result) HPF (ref 0–3)

## 2020-06-12 RX ADMIN — POTASSIUM BICARBONATE SCH MEQ: 977.5 TABLET, EFFERVESCENT ORAL at 10:30

## 2020-06-12 NOTE — EEG
*******************************************************************************
********************************************************************************
*****

Referring Physician: BRINA MORRIS   

 *******************************************************************************
********************************************************************************
*****

EEG #  

TEST TYPE:  CONTINUOUS EXTENDED VIDEO EEG



REPORT:



This EEG was performed using 24 channel [employer] digital video EEG machine with 24 
disc electrodes.  This was an extended inpatient video EEG recording. Digital 
analysis of the EEG was done for spike and seizure detection which revealed no 
abnormalities.



BACKGROUND:  There is a nonsustained posterior background rhythm of 8.5 hertz 
on the right.  Absence of posterior background rhythm on the left.  

HYPERVENTILATION:  Not performed

PHOTIC STIMULATION:  No significant response seen with photic stimulation.

SLEEP:  Drowsiness and sleep are observed.



EEG DIAGNOSIS:



1.)  Low amplitude delta and theta activity seen in the right frontotemporal 
and parietal-occipital regions.

2.)  Occasional irregular theta activity seen in the left frontotemporal and 
parietal-occipital regions.

3.)  Asymmetry of posterior background rhythm.  Absence of posterior background 
rhythm on the left.





CLINICAL INTERPRETATION:



THIS EEG IS CONSISTENT WITH FOCAL CEREBRAL DYSFUNCTION IN THE LEFT CEREBRAL 
HEMISPHERE.  THERE IS ALSO EVIDENCE OF MODERATE GENERALIZED NONSPECIFIC 
CEREBRAL DYSFUNCTION.  NO ICTAL OR INTERICTAL EPILEPTIFORM ABNORMALITIES SEEN 
DURING THE RECORDING.







Technician: MAGUI

: [email or web address] ALVA

## 2020-06-12 NOTE — PDOC.HOSPP
- Subjective


Encounter Date: 06/12/20


Subjective: 





NEUROLOGY PROGRESS NOTE





No acute events overnight.


Patient off cardene drip and BP is under control.  at bedside.





- Objective


Vital Signs & Weight: 


 Vital Signs (12 hours)











  Temp Pulse Resp BP BP Pulse Ox


 


 06/12/20 08:59       95


 


 06/12/20 08:36  98.1 F  84  16  163/93 H   95


 


 06/12/20 04:23  98.1 F  65  12   117/77  94 L








 Weight











Admit Weight                   167 lb


 


Weight                         185 lb











 Most Recent Monitor Data











Heart Rate from ECG            88


 


NIBP                           147/93


 


NIBP BP-Mean                   111


 


Respiration from ECG           16


 


SpO2                           93














I&O: 


 











 06/11/20 06/12/20 06/13/20





 06:59 06:59 06:59


 


Intake Total 2521 240 250


 


Output Total 2260 1485 


 


Balance 261 -1245 250











Result Diagrams: 


 06/12/20 04:38





 06/12/20 04:38


Additional Labs: 


 Accuchecks











  06/11/20





  17:54


 


POC Glucose  109











Radiology Reviewed by me: Yes


EKG Reviewed by me: Yes





Hospitalist ROS





- Review of Systems


ROS unobtainable: due to mental status (aphasia)





- Medication


Medications: 


Active Medications











Generic Name Dose Route Start Last Admin





  Trade Name Freq  PRN Reason Stop Dose Admin


 


Acetaminophen  650 mg  06/11/20 07:09  06/11/20 22:29





  Tylenol  PO   650 mg





  Q6H PRN   Administration





  Mild-Moderate Pain (1-5)   





     





     





     


 


Atorvastatin Calcium  40 mg  06/11/20 21:00  06/11/20 22:13





  Lipitor  PO   40 mg





  HS DIANA   Administration





     





     





     





     


 


Carvedilol  12.5 mg  06/11/20 17:00  06/12/20 08:42





  Coreg  PO   12.5 mg





  BID-WM DIANA   Administration





     





     





     





     


 


Famotidine  20 mg  06/10/20 21:00  06/12/20 08:42





  Pepcid  PO   20 mg





  Q12HR DIANA   Administration





     





     





     





     


 


Hydralazine HCl  10 mg  06/10/20 17:00  06/11/20 22:29





  Apresoline  SLOW IVP   10 mg





  Q4H PRN   Administration





  SBP GREATER THAN 160   





     





     





     


 


Nicardipine HCl 25 mg/ Sodium  250 mls @ 0 mls/hr  06/09/20 08:44  06/10/20 11:

08





  Chloride  IVPB   250 mls





  INF PRN   Administration





  SBP > 140   





     





  Protocol   





  Titrate   














- Exam


Eye: PERRL


ENT: normocephalic atraumatic


Neck: supple


Heart: RRR


Respiratory: CTAB


Gastrointestinal: soft


Extremities: no cyanosis


Skin: normal turgor


Neurological: no new deficit, hemiplegia, speech deficit, vision deficit


Neurological - other findings: right hemiplegia


Musculoskeletal - other findings: ncreased tone on right


Psychiatric: somnolent





Hosp A/P


(1) Hemorrhagic cerebrovascular accident (CVA)


Code(s): I61.9 - NONTRAUMATIC INTRACEREBRAL HEMORRHAGE, UNSPECIFIED   Status: 

Acute   





(2) Chronic hypertension


Code(s): I10 - ESSENTIAL (PRIMARY) HYPERTENSION   Status: Acute   





- Plan


plan discussed w/ family, PT/OT, speech therapy





60 year old female with hemorrhagic stroke. Initial HCT  showed  large 

intraparenchymal hematoma in the left temporal parietal region measuring about 

5.2 cm x 3.9 cm x 5.4 cm with adjacent vasogenic edema.  There is a midline 

shift to the right side for about 8 mm.  There is effacement of the occipital 

horn of the lateral ventricle. Repeat HCT stable. 


MRI brain from 6/11/2020 reviewed which showed stable findings and left 

parietal lobe hemorrhage.


EEG ongoing for intermittent confusion. Will follow up on read.


2 D Echo pending


Neurochecks every 2 hours.


Repeat NCHCT if the condition declines.


Strict control of BP . Patient off cardene drip and now on oral medications.


Telemetry


Strict control  of BG.


Continue high intensity  statin  for secondary stroke prevention.


Continue supportive measures.


PT/OT/ Speech  recommended rehab. Awaiting placement.


Continue medical management per primary team.


Plan discussed in detail with the patient's

## 2020-06-12 NOTE — PDOC.FM
- Subjective


Subjective: 





Patient doing well this morning. SBPs up in 160s-170s systolic yesterday, Coreg 

dose was increased yesterday evening and this morning all SBPs under control. 

Pt able to respond yes/no to questions. Follow simple commands. Difficulty with 

speech still.





- Objective


MAR Reviewed: Yes


Vital Signs & Weight: 


 Vital Signs (12 hours)











  Temp Pulse Resp BP BP BP Pulse Ox


 


 06/12/20 04:23  98.1 F  65  12    117/77  94 L


 


 06/11/20 23:26  99.1 F  83  12    131/64  95


 


 06/11/20 22:29   83   170/93 H   


 


 06/11/20 20:01  98.6 F  81  16   135/76   95








 Weight











Admit Weight                   75.75 kg


 


Weight                         83.915 kg











 Most Recent Monitor Data











Heart Rate from ECG            88


 


NIBP                           147/93


 


NIBP BP-Mean                   111


 


Respiration from ECG           16


 


SpO2                           93














I&O: 


 











 06/10/20 06/11/20 06/12/20





 06:59 06:59 06:59


 


Intake Total 3933 2521 240


 


Output Total 3150 2260 1485


 


Balance 783 261 -1245











Result Diagrams: 


 06/12/20 04:38





 06/12/20 04:38





Phys Exam





- Physical Examination


Constitutional: NAD


HEENT: moist MMs, sclera anicteric


Neck: no JVD, supple


Respiratory: no wheezing, clear to auscultation bilateral


Cardiovascular: RRR, no significant murmur


Gastrointestinal: soft, no distention, positive bowel sounds


Musculoskeletal: pulses present


trace edema in BLE


Neurological: moves all 4 limbs


gross motor strength 3/5 in all extremities, slighly less on right


Psychiatric: normal affect


Deviation from normal: alert, oriented to person & place (hospital)


Skin: no rash, normal turgor





Dx/Plan


(1) Hemorrhagic cerebrovascular accident (CVA)


Code(s): I61.9 - NONTRAUMATIC INTRACEREBRAL HEMORRHAGE, UNSPECIFIED   Status: 

Acute   





(2) Chronic hypertension


Code(s): I10 - ESSENTIAL (PRIMARY) HYPERTENSION   Status: Acute   





- Plan


Plan: 





Patient is a 61 yo female who was found with AMS at home is admitted for 

hemorrhagic CVA: 





#Acute hemorrhagic CVA with subfalcine herniation


-Neuro exam unchanged today


-S/P mannitol x1


-BP control controlled with PO coreg started on 6/10, cardene gtt discontinued 

on 6/10


   -Coreg dose increased from 6.25 to 12.5 mg BID on 6/11


-PT/ST/OT evaluated, all recommend rehab placement


-ECHO pending


-carotid dopplers unremarkable


-transferred to stroke unit on 6/11


-MRI on 6/11 stable compared to CT head on 6/9





#Chronic HTN


-continue Coreg, as above





#Hypokalemia


-Replace, check Mg





VTE: scd's, anticoagulation contraindicated


Diet: Puree--can advanced to ground textured solids per Speech therapy recs


Fluids: SL


Code: full per 








Dispo: Stable, admitted to inpatient on stroke unit. Continue CVA w/u with CM 

consulted to work on placement at rehab facility.








Addendum - Attending





- Attending Attestation


Date/Time: 06/12/20 5900





I personally evaluated the patient and discussed the management with Dr. Quijano.


I agree with the History, Examination, Assessment and Plan documented above 

with any addition or exceptions noted below.

## 2020-06-13 LAB
ANION GAP SERPL CALC-SCNC: 16 MMOL/L (ref 10–20)
BASOPHILS # BLD AUTO: 0 THOU/UL (ref 0–0.2)
BASOPHILS NFR BLD AUTO: 0.2 % (ref 0–1)
BUN SERPL-MCNC: 16 MG/DL (ref 9.8–20.1)
CALCIUM SERPL-MCNC: 9.7 MG/DL (ref 7.8–10.44)
CHLORIDE SERPL-SCNC: 105 MMOL/L (ref 98–107)
CO2 SERPL-SCNC: 22 MMOL/L (ref 22–29)
CREAT CL PREDICTED SERPL C-G-VRATE: 91 ML/MIN (ref 70–130)
EOSINOPHIL # BLD AUTO: 0.1 THOU/UL (ref 0–0.7)
EOSINOPHIL NFR BLD AUTO: 0.6 % (ref 0–10)
GLUCOSE SERPL-MCNC: 124 MG/DL (ref 70–105)
HGB BLD-MCNC: 14.3 G/DL (ref 12–16)
LYMPHOCYTES # BLD: 1.4 THOU/UL (ref 1.2–3.4)
LYMPHOCYTES NFR BLD AUTO: 11.3 % (ref 21–51)
MCH RBC QN AUTO: 27.7 PG (ref 27–31)
MCV RBC AUTO: 83.7 FL (ref 78–98)
MONOCYTES # BLD AUTO: 0.8 THOU/UL (ref 0.11–0.59)
MONOCYTES NFR BLD AUTO: 6.4 % (ref 0–10)
NEUTROPHILS # BLD AUTO: 9.8 THOU/UL (ref 1.4–6.5)
NEUTROPHILS NFR BLD AUTO: 81.5 % (ref 42–75)
PLATELET # BLD AUTO: 249 THOU/UL (ref 130–400)
POTASSIUM SERPL-SCNC: 3.9 MMOL/L (ref 3.5–5.1)
RBC # BLD AUTO: 5.16 MILL/UL (ref 4.2–5.4)
SODIUM SERPL-SCNC: 139 MMOL/L (ref 136–145)
WBC # BLD AUTO: 12 THOU/UL (ref 4.8–10.8)

## 2020-06-13 RX ADMIN — CEFTRIAXONE SCH MLS: 1 INJECTION, POWDER, FOR SOLUTION INTRAMUSCULAR; INTRAVENOUS at 05:35

## 2020-06-13 RX ADMIN — POTASSIUM BICARBONATE SCH MEQ: 978 TABLET, EFFERVESCENT ORAL at 10:01

## 2020-06-13 NOTE — PDOC.FM
- Subjective


Subjective: 





Patient doing okay this morning, per nursing and night team reports the patient 

was up for portion of night with a headache. This appears to be correlated with 

high blood pressures at the time. Max recorded is 184/91, required 3 doses of 

prn Hydralazine. Once BP improved then headache resolved. CT head was obtained 

with preliminary report revealing no changes from previous imaging, official 

read still pending. Pt able to respond yes/no to questions. Follow simple 

commands. Continued difficulty with speech, such as word finding. 





- Objective


MAR Reviewed: Yes


Vital Signs & Weight: 


 Vital Signs (12 hours)











  Temp Pulse Resp BP BP BP Pulse Ox


 


 06/13/20 07:11  98.1 F  78  16    145/79 H  95


 


 06/13/20 04:42  97.6 F  83  14   138/81   95


 


 06/13/20 01:12   88    141/85 H  


 


 06/13/20 00:48   88   184/91 H   


 


 06/13/20 00:28   85   170/88 H   


 


 06/13/20 00:09      170/88 H  


 


 06/13/20 00:01  98.4 F  53 L  18   155/96 H   93 L


 


 06/12/20 22:43      150/97 H  


 


 06/12/20 22:36   76   175/91 H   


 


 06/12/20 20:31  98.6 F  89  16   125/70   97








 Weight











Admit Weight                   78.199 kg


 


Weight                         81.647 kg











 Most Recent Monitor Data











Heart Rate from ECG            88


 


NIBP                           147/93


 


NIBP BP-Mean                   111


 


Respiration from ECG           16


 


SpO2                           93














I&O: 


 











 06/12/20 06/13/20 06/14/20





 06:59 06:59 06:59


 


Intake Total 240 1450 


 


Output Total 1485 1625 


 


Balance -1245 -175 











Result Diagrams: 


 06/13/20 07:07





 06/13/20 05:13





Phys Exam





- Physical Examination


Constitutional: NAD


HEENT: moist MMs


Neck: no JVD, supple


Respiratory: no wheezing, clear to auscultation bilateral


Cardiovascular: RRR, no significant murmur


Gastrointestinal: soft, no distention, positive bowel sounds


Musculoskeletal: pulses present


trace nonpitting edema in BLE. Gross motor strength 3/5 in extremities.


Neurological: normal sensation, moves all 4 limbs


Deviation from normal: flat affect, alert, oriented to person


Skin: no rash, normal turgor





Dx/Plan


(1) Hemorrhagic cerebrovascular accident (CVA)


Code(s): I61.9 - NONTRAUMATIC INTRACEREBRAL HEMORRHAGE, UNSPECIFIED   Status: 

Acute   





(2) Chronic hypertension


Code(s): I10 - ESSENTIAL (PRIMARY) HYPERTENSION   Status: Acute   





- Plan


Plan: 





Patient is a 61 yo female who was found with AMS at home is admitted for 

hemorrhagic CVA: 





#Acute hemorrhagic CVA with subfalcine herniation


-Neuro exam unchanged today


-S/P mannitol x1


-BP control with PO coreg started on 6/10, cardene gtt discontinued on 6/10


   -Coreg dose increased from 6.25 to 12.5 mg BID on 6/11


   -overnight of 6/12-13 required 3 doses of prn Hydralazine, consider adding 

another BP agent today


-PT/ST/OT evaluated, all recommend rehab placement


-ECHO pending


-carotid dopplers unremarkable


-transferred to stroke unit on 6/11


-MRI on 6/11 stable compared to CT head on 6/9





#Chronic HTN


-continue Coreg, as above


-consider 2nd agent





#Hypokalemia


-Replace, check Mg








Social: Case Management consulted for placement. Choice letter signed for 

[facility] inpatient re[facility], pending insurance approval.








VTE: scd's, anticoagulation contraindicated


Diet: Puree--can advanced to ground textured solids per Speech therapy recs


Fluids: SL


Code: full per 








Dispo: Stable, admitted to inpatient on stroke unit. Continue CVA w/u with CM 

consulted to work on placement at rehab facility.





Addendum - Attending





- Attending Attestation


Date/Time: 06/13/20 9066





I personally evaluated the patient and discussed the management with Dr. Quijano. 


I agree with the History, Examination, Assessment and Plan documented above 

with any addition or exceptions noted below.





Patient with mild mental status and speech improvement. Had severe headache 

overnight, CT overall stable, and symptoms resolved with BP control. Escalating 

BP therapy today. Continue post CVA care and working to get patient to rehab in 

the next few days.

## 2020-06-13 NOTE — CT
PRELIMINARY REPORT/[facility]IOLOGY/EMERGENCY AFTER HOURS PROCEDURE:



PROCEDURE: CT Head without Contrast . 



HISTORY: Altered mental status. 



TECHNIQUE: Axial images were performed without the administration of IV contrast with or without mult
iplanar reformations . 



COMPARISON: 6/9/2020. 



FINDINGS: Unchanged 5 cm intraparenchymal hemorrhage LEFT parietal lobe with adjacent edema. Unchange
d 8mm midline shift of the septum pellucidum to the RIGHT. No hydrocephalus. No other acute change

identified. 



IMPRESSION: Unchanged LEFT parietal lobe intraparenchymal hemorrhage with edema with midline shift to
 the RIGHT.



ELECTRONICALLY SIGNED BY:

Hugo Gomez MD

Jun 13, 2020 2:28:34 AM CDT





FINAL REPORT



CT BRAIN WITHOUT CONTRAST:



HISTORY: 

Change in mentation. Altered mental status.



COMPARISON: 

6/9/2020





FINDINGS:

Hemorrhage: Stable left temporal lobe hematoma with associated edema and sulcal effacement.

Brain parenchyma: 0.9 cm left-to-right subfalcine herniation which is slightly progressed.

Ventricular system: Stable mass effect upon the left lateral ventricle. Left uncal herniation with ef
facement of the left ambient cistern.

Calvarium: Intact.

Sinuses and mastoid air cells: Adequate aeration.



IMPRESSION:



1. This report is in agreement with initial report by direct radiology.

2. Persistent left intraparenchymal hematoma with edema. There is slight progression of left to right
 subfalcine shift.





Transcribed Date/Time: 6/13/2020 8:11 AM



Reported By: Daniel Rivas 

Electronically Signed:  6/13/2020 8:16 AM

## 2020-06-14 LAB
ANION GAP SERPL CALC-SCNC: 14 MMOL/L (ref 10–20)
BASOPHILS # BLD AUTO: 0.1 THOU/UL (ref 0–0.2)
BASOPHILS NFR BLD AUTO: 0.5 % (ref 0–1)
BUN SERPL-MCNC: 18 MG/DL (ref 9.8–20.1)
CALCIUM SERPL-MCNC: 9.6 MG/DL (ref 7.8–10.44)
CHLORIDE SERPL-SCNC: 106 MMOL/L (ref 98–107)
CO2 SERPL-SCNC: 23 MMOL/L (ref 22–29)
CREAT CL PREDICTED SERPL C-G-VRATE: 87 ML/MIN (ref 70–130)
EOSINOPHIL # BLD AUTO: 0.2 THOU/UL (ref 0–0.7)
EOSINOPHIL NFR BLD AUTO: 2.1 % (ref 0–10)
GLUCOSE SERPL-MCNC: 108 MG/DL (ref 70–105)
HGB BLD-MCNC: 13.6 G/DL (ref 12–16)
LYMPHOCYTES # BLD: 1.6 THOU/UL (ref 1.2–3.4)
LYMPHOCYTES NFR BLD AUTO: 15.6 % (ref 21–51)
MCH RBC QN AUTO: 27.4 PG (ref 27–31)
MCV RBC AUTO: 84 FL (ref 78–98)
MONOCYTES # BLD AUTO: 0.6 THOU/UL (ref 0.11–0.59)
MONOCYTES NFR BLD AUTO: 6 % (ref 0–10)
NEUTROPHILS # BLD AUTO: 7.8 THOU/UL (ref 1.4–6.5)
NEUTROPHILS NFR BLD AUTO: 75.8 % (ref 42–75)
PLATELET # BLD AUTO: 232 THOU/UL (ref 130–400)
POTASSIUM SERPL-SCNC: 3.6 MMOL/L (ref 3.5–5.1)
RBC # BLD AUTO: 4.96 MILL/UL (ref 4.2–5.4)
SODIUM SERPL-SCNC: 139 MMOL/L (ref 136–145)
WBC # BLD AUTO: 10.2 THOU/UL (ref 4.8–10.8)

## 2020-06-14 RX ADMIN — POTASSIUM BICARBONATE SCH MEQ: 978 TABLET, EFFERVESCENT ORAL at 09:46

## 2020-06-14 RX ADMIN — CEFTRIAXONE SCH MLS: 1 INJECTION, POWDER, FOR SOLUTION INTRAMUSCULAR; INTRAVENOUS at 07:33

## 2020-06-14 NOTE — PDOC.FM
- Subjective


Subjective: 





Patient's exam improving this morning. She is able to have light conversation 

today with speaking in short sentences, for example when asked where her sons 

are she says "they are probably working at home". Still some difficulty with 

word finding. Able to follow simple commands. Denies any complaints this 

morning. BP stable overnight. 





- Objective


MAR Reviewed: Yes


Vital Signs & Weight: 


 Vital Signs (12 hours)











  Temp Pulse Resp BP BP Pulse Ox


 


 06/14/20 03:34  98.2 F  78  18  133/75   95


 


 06/13/20 23:52  98.6 F  82  18   162/92 H  93 L








 Weight











Admit Weight                   78.199 kg


 


Weight                         81.647 kg











 Most Recent Monitor Data











Heart Rate from ECG            88


 


NIBP                           147/93


 


NIBP BP-Mean                   111


 


Respiration from ECG           16


 


SpO2                           93














I&O: 


 











 06/13/20 06/14/20 06/15/20





 06:59 06:59 06:59


 


Intake Total 1450 476 


 


Output Total 1625 375 


 


Balance -175 101 











Result Diagrams: 


 06/14/20 04:15





 06/14/20 04:15





Phys Exam





- Physical Examination


Constitutional: NAD


HEENT: moist MMs, sclera anicteric


Neck: no JVD, supple


Respiratory: no wheezing, clear to auscultation bilateral


Cardiovascular: RRR, no significant murmur


Gastrointestinal: soft, no distention, positive bowel sounds


Musculoskeletal: no edema, pulses present


Gross motor strength 3/5 in extremities.


Neurological: normal sensation, moves all 4 limbs


Psychiatric: normal affect


Deviation from normal: alert, oriented to person and place


Skin: no rash, normal turgor





Dx/Plan


(1) Hemorrhagic cerebrovascular accident (CVA)


Code(s): I61.9 - NONTRAUMATIC INTRACEREBRAL HEMORRHAGE, UNSPECIFIED   Status: 

Acute   





(2) Chronic hypertension


Code(s): I10 - ESSENTIAL (PRIMARY) HYPERTENSION   Status: Acute   





- Plan


Plan: 





Patient is a 61 yo female who was found with AMS at home is admitted for 

hemorrhagic CVA: 





#Acute hemorrhagic CVA with subfalcine herniation


-Neuro exam improved today


-S/P mannitol x1


-BP control with PO coreg started on 6/10, cardene gtt discontinued on 6/10


   -Coreg dose increased from 6.25 to 12.5 mg BID on 6/11


   -overnight of 6/12-13 required 3 doses of prn Hydralazine


   -Norvasc 5 mg daily added to BP regimen on 6/13


-PT/ST/OT evaluated, all recommend rehab placement


-ECHO shows EF 55-60%, mild LVH, diastolic dysfunction, mild MR & TR


-carotid dopplers unremarkable


-transferred to stroke unit on 6/11


-MRI on 6/11 stable compared to CT head on 6/9





#Chronic HTN


-continue Coreg, as above


-added Norvasc 5 mg on 6/13





#Hypokalemia, resolved


-replace prn


-monitor AM lab








Social: Case Management consulted for placement. Choice letter signed for 

[facility], pending insurance approval.








VTE: scd's, anticoagulation contraindicated


Diet: Puree--can advanced to ground textured solids per Speech therapy recs


Fluids: SL


Code: full per 








Dispo: Stable, admitted to inpatient on stroke unit. Continue CVA w/u with CM 

consulted to work on placement at rehab facility.





Addendum - Attending





- Attending Attestation


Date/Time: 06/14/20 5437





I personally evaluated the patient and discussed the management with Dr. Quijano. 


I agree with the History, Examination, Assessment and Plan documented above 

with any addition or exceptions noted below.





Patient improved. Continue therapy and awaiting rehab acceptance due to her 

large hemorrhagic infarct. BP improved with current regimen, no changes to that 

today. Once accepted for rehab should be stable for transfer at that time.

## 2020-06-15 LAB
ANION GAP SERPL CALC-SCNC: 11 MMOL/L (ref 10–20)
BASOPHILS # BLD AUTO: 0 THOU/UL (ref 0–0.2)
BASOPHILS NFR BLD AUTO: 0.5 % (ref 0–1)
BUN SERPL-MCNC: 17 MG/DL (ref 9.8–20.1)
CALCIUM SERPL-MCNC: 9.4 MG/DL (ref 7.8–10.44)
CHLORIDE SERPL-SCNC: 107 MMOL/L (ref 98–107)
CO2 SERPL-SCNC: 25 MMOL/L (ref 22–29)
CREAT CL PREDICTED SERPL C-G-VRATE: 95 ML/MIN (ref 70–130)
EOSINOPHIL # BLD AUTO: 0.4 THOU/UL (ref 0–0.7)
EOSINOPHIL NFR BLD AUTO: 3.7 % (ref 0–10)
GLUCOSE SERPL-MCNC: 106 MG/DL (ref 70–105)
HGB BLD-MCNC: 13.4 G/DL (ref 12–16)
LYMPHOCYTES # BLD: 1.5 THOU/UL (ref 1.2–3.4)
LYMPHOCYTES NFR BLD AUTO: 15.4 % (ref 21–51)
MCH RBC QN AUTO: 28 PG (ref 27–31)
MCV RBC AUTO: 84.1 FL (ref 78–98)
MONOCYTES # BLD AUTO: 0.8 THOU/UL (ref 0.11–0.59)
MONOCYTES NFR BLD AUTO: 7.5 % (ref 0–10)
NEUTROPHILS # BLD AUTO: 7.3 THOU/UL (ref 1.4–6.5)
NEUTROPHILS NFR BLD AUTO: 73 % (ref 42–75)
PLATELET # BLD AUTO: 233 THOU/UL (ref 130–400)
POTASSIUM SERPL-SCNC: 3.8 MMOL/L (ref 3.5–5.1)
RBC # BLD AUTO: 4.79 MILL/UL (ref 4.2–5.4)
SODIUM SERPL-SCNC: 139 MMOL/L (ref 136–145)
WBC # BLD AUTO: 10 THOU/UL (ref 4.8–10.8)

## 2020-06-15 RX ADMIN — POTASSIUM BICARBONATE SCH MEQ: 978 TABLET, EFFERVESCENT ORAL at 08:18

## 2020-06-15 RX ADMIN — CEFTRIAXONE SCH MLS: 1 INJECTION, POWDER, FOR SOLUTION INTRAMUSCULAR; INTRAVENOUS at 05:21

## 2020-06-15 NOTE — PDOC.HOSPP
- Subjective


Encounter Date: 06/15/20


Subjective: 





NEUROLOGY PROGRESS NOTE





Patient more alert today but persistent aphasia. Per , able to name few 

things . Following commands appropriately.





- Objective


Vital Signs & Weight: 


 Vital Signs (12 hours)











  Temp Pulse Resp BP BP BP BP


 


 06/15/20 08:19   74     


 


 06/15/20 08:15  98.5 F  74  14   138/69  


 


 06/15/20 05:27       127/87 


 


 06/15/20 05:06   74   168/95 H   


 


 06/15/20 04:51  98.7 F  74  18     168/95 H


 


 06/15/20 00:31  98.3 F  68  16    146/86 H 














  Pulse Ox


 


 06/15/20 08:19 


 


 06/15/20 08:15  95


 


 06/15/20 05:27 


 


 06/15/20 05:06 


 


 06/15/20 04:51  97


 


 06/15/20 00:31  95








 Weight











Admit Weight                   172 lb 6.4 oz


 


Weight                         182 lb











 Most Recent Monitor Data











Heart Rate from ECG            88


 


NIBP                           147/93


 


NIBP BP-Mean                   111


 


Respiration from ECG           16


 


SpO2                           93














I&O: 


 











 06/14/20 06/15/20 06/16/20





 06:59 06:59 06:59


 


Intake Total 626  


 


Output Total 4223  775


 


Balance -529  -327











Result Diagrams: 


 06/15/20 04:17





 06/15/20 04:17


Radiology Reviewed by me: Yes


EKG Reviewed by me: Yes





Hospitalist ROS





- Review of Systems


ROS unobtainable: due to mental status (aphasia)





- Medication


Medications: 


Active Medications











Generic Name Dose Route Start Last Admin





  Trade Name Freq  PRN Reason Stop Dose Admin


 


Acetaminophen  650 mg  06/11/20 07:09  06/13/20 01:07





  Tylenol  PO   650 mg





  Q6H PRN   Administration





  Mild-Moderate Pain (1-5)   





     





     





     


 


Amlodipine Besylate  5 mg  06/13/20 09:00  06/15/20 08:19





  Norvasc  PO   5 mg





  DAILY DIANA   Administration





     





     





     





     


 


Atorvastatin Calcium  40 mg  06/11/20 21:00  06/14/20 21:34





  Lipitor  PO   40 mg





  HS DIANA   Administration





     





     





     





     


 


Carvedilol  12.5 mg  06/11/20 17:00  06/15/20 08:19





  Coreg  PO   12.5 mg





  BID-WM DIANA   Administration





     





     





     





     


 


Famotidine  20 mg  06/10/20 21:00  06/15/20 08:19





  Pepcid  PO   20 mg





  Q12HR DIANA   Administration





     





     





     





     


 


Hydralazine HCl  5 mg  06/13/20 00:17  06/15/20 05:06





  Apresoline  SLOW IVP   5 mg





  Q15MIN PRN   Administration





  SBP GREATER THAN 160   





     





     





     


 


Potassium Bicarbonate/Citric Acid  25 meq  06/13/20 08:00  06/15/20 08:18





  K-Vescent  PO   25 meq





  QAM-WM DIANA   Administration





     





     





     





     


 


Sodium Chloride  10 ml  06/09/20 08:11  06/15/20 08:19





  Flush - Normal Saline  IVF   10 ml





  PRN PRN   Administration





  Saline Flush   





     





     





     














- Exam


General Appearance: awake alert


Eye: PERRL, anicteric sclera


ENT: normocephalic atraumatic


Neck: supple


Heart: RRR


Respiratory: CTAB


Gastrointestinal: soft


Extremities: no cyanosis


Skin: normal turgor


Neurological: no new deficit, hemiplegia, speech deficit


Neurological - other findings: right hemiparesis


Musculoskeletal: no muscle wasting


Musculoskeletal - other findings: decreased tone in RUE


Psychiatric: normal affect, normal behavior (APHASIC)





Hosp A/P


(1) Hemorrhagic cerebrovascular accident (CVA)


Code(s): I61.9 - NONTRAUMATIC INTRACEREBRAL HEMORRHAGE, UNSPECIFIED   Status: 

Acute   





(2) Chronic hypertension


Code(s): I10 - ESSENTIAL (PRIMARY) HYPERTENSION   Status: Acute   





- Plan


plan discussed w/ family, PT/OT, speech therapy, DVT proph w/lovenox





60 year old female with hemorrhagic stroke continues to be aphasic with right 

hemiparesis.


MRI brain from 6/11/2020 reviewed which showed stable findings and left 

parietal lobe hemorrhage.


EEG  reviewed which did not reveal any seizure activity.


2 D Echo pending


Neurochecks every 4 hours.


Repeat NCHCT if the condition declines.


Strict control of BP and BG.


Telemetry


Continue aspirin high intensity  statin  for secondary stroke prevention.


Continue supportive measures.


PT/OT/ Speech  recommended rehab. Awaiting placement.


Continue medical management per primary team.


Plan discussed in detail with the patinet and the patient's

## 2020-06-15 NOTE — PDOC.FM
- Subjective


Subjective: 


Pt speaking in yes/no answers this morning. follows commands well. No events 

overnight, answers no to any pains, concerns, or complaints this morning. 

Informed of current placement status.





- Objective


Vital Signs & Weight: 


 Vital Signs (12 hours)











  Temp Pulse Resp BP BP BP Pulse Ox


 


 06/15/20 05:27      127/87  


 


 06/15/20 05:06   74   168/95 H   


 


 06/15/20 04:51  98.7 F  74  18    168/95 H  97


 


 06/15/20 00:31  98.3 F  68  16   146/86 H   95


 


 06/14/20 19:40  97.9 F  70  18   137/77   96








 Weight











Admit Weight                   78.199 kg


 


Weight                         82.554 kg











 Most Recent Monitor Data











Heart Rate from ECG            88


 


NIBP                           147/93


 


NIBP BP-Mean                   111


 


Respiration from ECG           16


 


SpO2                           93














I&O: 


 











 06/13/20 06/14/20 06/15/20





 06:59 06:59 06:59


 


Intake Total 1450 626 


 


Output Total 1625 1225 


 


Balance -175 -599 











Result Diagrams: 


 06/15/20 04:17





 06/15/20 04:17





Phys Exam





- Physical Examination


Constitutional: NAD


HEENT: moist MMs


Respiratory: clear to auscultation bilateral


Cardiovascular: RRR


Musculoskeletal: no edema, pulses present


Neurological: moves all 4 limbs


Yes/No communication


Skin: no rash, cap refill <2 seconds





Dx/Plan





- Plan


Plan: 


Acute hemorrhagic CVA with subfalcine herniation


-Stable neuro exam today


-PT/ST/OT evaluated, all recommend inpt rehab


   - Pending placement





Chronic HTN


-Currently titrating


-Coreg 12.5 BID, Norvasc 5mg


-Prn Hydralazine





Hypokalemia, resolved


-Replace prn





UTI


-UC + staph epi


-Rocephin 1gm x3 - will DC as should be adequately treated


-WBC and left shift resolved


-Hand in place





Social: Case Management consulted for placement. Choice letter signed for 

[facility] inpatient rehab, pending insurance approval. No progress over weekend.








VTE: scd's, anticoagulation contraindicated


Diet: Puree--can advanced to ground textured solids per Speech therapy recs


Fluids: SL


Code: full per 








Dispo: Stable, stroke unit. Pending inpt rehab placement.





Addendum - Attending





- Attending Attestation


Date/Time: 06/15/20 1842





I personally evaluated the patient and discussed the management with Dr. Arceo


I agree with the History, Examination, Assessment and Plan documented above 

with any addition or exceptions noted below - Patient up in neuro chair. 

Answers with yes/no; brief responses. Denies any complaints. Patient and 

 concerned about hand. Afebrile VSS. A/P: 1) Left hemorrhagic CVA- 

continue PT/OT/ST. Awaiting insurance approval for rehab. 2) HTN- BP much 

improved; continue current medications. 3) Urinary retention- voiding trial.

## 2020-06-16 VITALS — DIASTOLIC BLOOD PRESSURE: 75 MMHG | SYSTOLIC BLOOD PRESSURE: 129 MMHG | TEMPERATURE: 98 F

## 2020-06-16 LAB
ANION GAP SERPL CALC-SCNC: 12 MMOL/L (ref 10–20)
BASOPHILS # BLD AUTO: 0 THOU/UL (ref 0–0.2)
BASOPHILS NFR BLD AUTO: 0.4 % (ref 0–1)
BUN SERPL-MCNC: 15 MG/DL (ref 9.8–20.1)
CALCIUM SERPL-MCNC: 9.8 MG/DL (ref 7.8–10.44)
CHLORIDE SERPL-SCNC: 105 MMOL/L (ref 98–107)
CO2 SERPL-SCNC: 27 MMOL/L (ref 22–29)
CREAT CL PREDICTED SERPL C-G-VRATE: 92 ML/MIN (ref 70–130)
EOSINOPHIL # BLD AUTO: 0.5 THOU/UL (ref 0–0.7)
EOSINOPHIL NFR BLD AUTO: 4.1 % (ref 0–10)
GLUCOSE SERPL-MCNC: 114 MG/DL (ref 70–105)
HGB BLD-MCNC: 13.3 G/DL (ref 12–16)
LYMPHOCYTES # BLD: 1.5 THOU/UL (ref 1.2–3.4)
LYMPHOCYTES NFR BLD AUTO: 13.5 % (ref 21–51)
MCH RBC QN AUTO: 26.4 PG (ref 27–31)
MCV RBC AUTO: 83.5 FL (ref 78–98)
MONOCYTES # BLD AUTO: 0.9 THOU/UL (ref 0.11–0.59)
MONOCYTES NFR BLD AUTO: 7.7 % (ref 0–10)
NEUTROPHILS # BLD AUTO: 8.5 THOU/UL (ref 1.4–6.5)
NEUTROPHILS NFR BLD AUTO: 74.4 % (ref 42–75)
PLATELET # BLD AUTO: 220 THOU/UL (ref 130–400)
POTASSIUM SERPL-SCNC: 3.8 MMOL/L (ref 3.5–5.1)
RBC # BLD AUTO: 5.05 MILL/UL (ref 4.2–5.4)
SODIUM SERPL-SCNC: 140 MMOL/L (ref 136–145)
WBC # BLD AUTO: 11.4 THOU/UL (ref 4.8–10.8)

## 2020-06-16 RX ADMIN — POTASSIUM BICARBONATE SCH MEQ: 978 TABLET, EFFERVESCENT ORAL at 09:53

## 2020-06-16 NOTE — PDOC.FM
- Subjective


Subjective: 


Pt awake and alert this morning. No complaints or events overnight. Denies any 

difficulty urinating with timed voids.





- Objective


Vital Signs & Weight: 


 Vital Signs (12 hours)











  Temp Pulse Resp BP Pulse Ox


 


 06/16/20 03:15  98.3 F  65  17  145/87 H  96


 


 06/15/20 23:55  99 F  75  16  166/90 H  94 L


 


 06/15/20 20:00  98.7 F  68  14  137/80  97








 Weight











Admit Weight                   78.199 kg


 


Weight                         82.554 kg











 Most Recent Monitor Data











Heart Rate from ECG            88


 


NIBP                           147/93


 


NIBP BP-Mean                   111


 


Respiration from ECG           16


 


SpO2                           93














I&O: 


 











 06/14/20 06/15/20 06/16/20





 06:59 06:59 06:59


 


Intake Total 626  750


 


Output Total 1225  1925


 


Balance -599  -1175











Result Diagrams: 


 06/16/20 04:19





 06/16/20 04:19





Phys Exam





- Physical Examination


Constitutional: NAD


HEENT: moist MMs, sclera anicteric


Respiratory: clear to auscultation bilateral


Cardiovascular: RRR


strength 4/5 on RUE


Neurological: non-focal, moves all 4 limbs


Aphasia much improved from yesterday. Speaking clear short sentences


Psychiatric: normal affect, A&O x 3





Dx/Plan





- Plan


Plan: 


Acute hemorrhagic CVA with subfalcine herniation


-Stable neuro exam today


-PT/ST/OT evaluated, all recommend inpt rehab


   - Pending placement





Chronic HTN


-Currently controlled until nearing end of dosing


-Will increase Coreg 12.5 to 25 BID, continue Norvasc 5mg - consider increase 

to 10mg if still not controlled


-Prn Hydralazine





Urinary Retention


- Hand DC'd yesterday


- Pt successful with timed voids currently





Hypokalemia, resolved


-Replace prn





UTI - S/p treatment








Social: Case Management consulted for placement. Choice letter signed for 

[facility] inpatient re[facility], pending insurance approval. No progress over weekend.








VTE: scd's, anticoagulation contraindicated


Diet: Puree--can advanced to ground textured solids per Speech therapy recs


Fluids: SL


Code: full per 








Dispo: Stable, stroke unit. Pending inpt rehab placement.





Addendum - Attending





- Attending Attestation


Date/Time: 06/16/20 8814





I personally evaluated the patient and discussed the management with Dr. Arceo


I agree with the History, Examination, Assessment and Plan documented above 

with any addition or exceptions noted below - Patient sitting up in bed. Able 

to void after hand d/c'd. Afebrile VSS A/P: 1) L hemorrhagic CVA - stable; 

continue PT/OT/ST; awaiting insurance approval for rehab. 2) HTN- improved; 

continue current meds.

## 2020-06-18 NOTE — DIS
DATE OF ADMISSION:  06/09/2020



DATE OF DISCHARGE:  06/16/2020



ADMITTING ATTENDING:  Eduardo Schulz MD.



DISCHARGE ATTENDING:  Belkys Chu MD.



RESIDENT:  Cristian Arceo DO.



CONSULTS:  

1. Neurology, Dr. Callie Dia.

2. Neurosurgery, Dr. Jose Maria ceron.



PROCEDURES:  

1. EEG on 06/12/2020 by Dr. Callie Dia.  Impression:  Low amplitude delta 
and

theta activity seen on right frontal temporal and parietal occipital regions.

Occasional regular theta activity seen in the left frontal, temporal and

parieto-occipital regions.  Asymmetry of posterior background rhythm.  Absence 
of

posterior background rhythm on the left.  Interpretation:  This EEG is 
consistent

with focal cerebral dysfunction in the left cerebral hemisphere.  There is also

evidence of moderate generalized nonspecific cerebral dysfunction.  No ictal or

interictal epileptiform abnormalities seen during the recording. 

2. Brain CT on 06/09/2020.  Impression:  Large acute left cerebral intra-axial

hematoma causing mass effect with subfalcine herniation.  Probably no 
significant

interval change. 

3. Carotid Doppler ultrasound 06/11/2020.  Impression:  No abnormal increased

velocities.  Minimal intimal thickening bilaterally.  Evidence of 
atherosclerotic

carotid artery vascular disease. 

4. Brain CT brain MRI 06/11/2020.  Impression:  Left parietal lobe hematoma is 
again

noted.  There were mass effect and midline shift which has not significantly 
changed

when compared to the previous CT. 

5. Brain CT 06/13/2020.  Impression:  Persistent left intraparenchymal hematoma 
with

edema with slight progression on left to right subfalcine shift. 

6. Echocardiogram 06/13/2020.  Impression:  LVEF estimated at 55% to 60% with 
mild

concentric left ventricular hypertrophy.  Suggestions of diastolic dysfunction. 



PRIMARY DIAGNOSES:  Acute left middle cerebral artery region stroke with 
hemorrhagic

conversion and subsequent subfalcine herniation. 



SECONDARY DIAGNOSIS:  Chronic hypertension.



DISCHARGE MEDICATIONS:  

1. Zyrtec 10 mg daily.  

2. Tylenol 650 mg q.6 hours p.r.n.

3. Norvasc 5 mg daily. 

4. Atorvastatin 40 mg at bedtime.

5. Dulcolax 10 mg daily.

6. Pepcid 20 mg b.i.d. 

7. Potassium 25 mEq daily. 

8. Coreg 25 mg b.i.d.



HISTORY OF PRESENT ILLNESS AND HOSPITAL COURSE:  A 60-year-old female with 

past medical history of hypertension, presented to our emergency department 

as a transfer from [facility] where she was diagnosed 
with a

hemorrhagic CVA, seen on brain CT.  The patient was last seen normal 17 hours 
prior

to arrival.  She was found by her  who is currently traveling abroad and 
was

unable to contact the patient, so he called in a wellness check and the patient 
was

found to be aphasic with right hemiplegia.  Initial blood pressures were greater

than 220 systolic.  Neurosurgery was consulted from the ER due to the 
hemorrhagic

conversion of the stroke with subfalcine hemorrhage.  They determined there was 
no

surgical intervention needed at that time.  Due to the patient's elevated blood

pressure, she was started on a Cardene drip in the ER and subsequently admitted 
to

the hospital.  The patient's blood pressures were rapidly controlled with 
Cardene

drip, which was gradually titrated off.  While the patient was off the Cardene 
drip,

oral medications were titrated up.  She was eventually found that Coreg 25 mg 
b.i.d.

and amlodipine 5 mg daily was a regimen that provided adequate control of the

patient's blood pressures.  Due to the acute strokes, Dr. Dia, Neurologist, 
was

consulted and saw the patient as well.  She performed an EEG with the above

findings.  No seizure activity was witnessed.  The patient was medically 
optimized

with atorvastatin and adequate blood pressure control.  Throughout the patient's

stay, she worked with physical therapy, occupational therapy and Speech Therapy.

Her neuro deficits gradually improved to the point where she was able to speak 
short

sentences clearly with minimal word-finding difficulties.  Her weakness greatly

improved to the point were her only residual deficits were 4/5 strength in the 
right

upper extremity.  The patient did experience some urinary retention during her

hospital stay, but prior to discharge, passed a voiding trial and was urinating 
on

her own without difficulty.  After discussion with the patient and her , 
they

elected to pursue inpatient rehab as recommended by Physical Therapy. 



DISCHARGE INSTRUCTIONS:  Location:  Inpatient Rehab Facility. 



Activity:  As tolerated and directed by the therapy.   



Diet:  Heart healthy. 



Followup:  PCP within 7 days.







Job ID:  [id number]



MTDD

## 2020-06-20 NOTE — EKG
Test Reason : AMS

Blood Pressure : ***/*** mmHG

Vent. Rate : 117 BPM     Atrial Rate : 104 BPM

   P-R Int : 000 ms          QRS Dur : 082 ms

    QT Int : 428 ms       P-R-T Axes : 000 004 038 degrees

   QTc Int : 597 ms

 

Sinus tachycardia

Confirmed by MILTON VELASCO DO ([id number]),  BLANQUITA SIU ([id number]) on 6/20/2020 12:10:42 PM

 

Referred By:             Confirmed By:MILTON VELASCO DO

## 2022-03-31 NOTE — PDOC.HOSPP
- Subjective


Encounter Date: 06/16/20


Subjective: 





NEUROLOGY PROGRESS NOTE





Patient more alert today but persistent aphasia.  She was able to tell her 

 name. Following commands appropriately.





- Objective


Vital Signs & Weight: 


 Vital Signs (12 hours)











  Temp Pulse Resp BP BP BP Pulse Ox


 


 06/16/20 11:53  98.0 F  76  16   129/75   98


 


 06/16/20 09:53   87   147/82 H   


 


 06/16/20 07:50  98.2 F  69  15   186/106 H   95


 


 06/16/20 07:12        96


 


 06/16/20 03:15  98.3 F  65  17    145/87 H  96








 Weight











Admit Weight                   172 lb 6.4 oz


 


Weight                         182 lb











 Most Recent Monitor Data











Heart Rate from ECG            88


 


NIBP                           147/93


 


NIBP BP-Mean                   111


 


Respiration from ECG           16


 


SpO2                           93














I&O: 


 











 06/15/20 06/16/20 06/17/20





 06:59 06:59 06:59


 


Intake Total  750 


 


Output Total  1925 


 


Balance  -1175 











Result Diagrams: 


 06/16/20 04:19





 06/16/20 04:19


Radiology Reviewed by me: Yes


EKG Reviewed by me: Yes





Hospitalist ROS





- Review of Systems


ROS unobtainable: due to mental status (aphasia)


Neurological: reports: weakness, incoordination, change in speech





- Medication


Medications: 


Active Medications











Generic Name Dose Route Start Last Admin





  Trade Name Freq  PRN Reason Stop Dose Admin


 


Acetaminophen  650 mg  06/11/20 07:09  06/13/20 01:07





  Tylenol  PO   650 mg





  Q6H PRN   Administration





  Mild-Moderate Pain (1-5)   





     





     





     


 


Amlodipine Besylate  5 mg  06/13/20 09:00  06/16/20 09:53





  Norvasc  PO   5 mg





  DAILY DIANA   Administration





     





     





     





     


 


Atorvastatin Calcium  40 mg  06/11/20 21:00  06/15/20 20:47





  Lipitor  PO   40 mg





  HS DIANA   Administration





     





     





     





     


 


Carvedilol  25 mg  06/16/20 08:00  06/16/20 09:52





  Coreg  PO   25 mg





  BID-WM DIANA   Administration





     





     





     





     


 


Famotidine  20 mg  06/10/20 21:00  06/15/20 20:47





  Pepcid  PO   20 mg





  Q12HR DIANA   Administration





     





     





     





     


 


Hydralazine HCl  5 mg  06/13/20 00:17  06/15/20 05:06





  Apresoline  SLOW IVP   5 mg





  Q15MIN PRN   Administration





  SBP GREATER THAN 160   





     





     





     


 


Potassium Bicarbonate/Citric Acid  25 meq  06/13/20 08:00  06/16/20 09:53





  K-Vescent  PO   25 meq





  QAM-WM DIANA   Administration





     





     





     





     


 


Sodium Chloride  10 ml  06/09/20 08:11  06/15/20 08:19





  Flush - Normal Saline  IVF   10 ml





  PRN PRN   Administration





  Saline Flush   





     





     





     














- Exam


General Appearance: awake alert


Eye: PERRL


ENT: normocephalic atraumatic


Neck: supple


Heart: RRR


Respiratory: CTAB


Gastrointestinal: soft


Extremities: no cyanosis


Skin: normal turgor


Neurological: facial droop, hemiplegia, speech deficit, vision deficit


Neurological - other findings: right hemiparesis


Musculoskeletal: no muscle wasting


Psychiatric: normal affect, normal behavior (aphasic)





Hosp A/P


(1) Hemorrhagic cerebrovascular accident (CVA)


Code(s): I61.9 - NONTRAUMATIC INTRACEREBRAL HEMORRHAGE, UNSPECIFIED   Status: 

Acute   





(2) Chronic hypertension


Code(s): I10 - ESSENTIAL (PRIMARY) HYPERTENSION   Status: Acute   





- Plan


plan discussed w/ family, PT/OT, speech therapy, out of bed/ambulate





60 year old female with hemorrhagic stroke continues to be aphasic with right 

hemiparesis. Interval improvement in neurological symptoms.





Alexandra discontinued. Participating in physical therapy.


MRI brain from 6/11/2020 reviewed which showed stable findings and left 

parietal lobe hemorrhage.


EEG  reviewed which did not reveal any seizure activity.


2 D Echo pending


Neurochecks every 4 hours.


Repeat NCHCT if the condition declines.


Strict control of BP and BG.


Telemetry


Continue aspirin high intensity  statin  for secondary stroke prevention.


Continue supportive measures.


PT/OT/ Speech  recommended rehab. Awaiting placement.


Continue medical management per primary team.


Plan discussed in detail with the  patient  and the patient's  Yes